# Patient Record
Sex: MALE | Race: WHITE | NOT HISPANIC OR LATINO | URBAN - NONMETROPOLITAN AREA
[De-identification: names, ages, dates, MRNs, and addresses within clinical notes are randomized per-mention and may not be internally consistent; named-entity substitution may affect disease eponyms.]

---

## 2021-01-11 ENCOUNTER — OFFICE VISIT (OUTPATIENT)
Dept: FAMILY MEDICINE CLINIC | Facility: CLINIC | Age: 61
End: 2021-01-11

## 2021-01-11 VITALS
SYSTOLIC BLOOD PRESSURE: 128 MMHG | DIASTOLIC BLOOD PRESSURE: 76 MMHG | HEIGHT: 67 IN | OXYGEN SATURATION: 99 % | HEART RATE: 65 BPM | WEIGHT: 160 LBS | RESPIRATION RATE: 16 BRPM | BODY MASS INDEX: 25.11 KG/M2

## 2021-01-11 DIAGNOSIS — S61.210A LACERATION OF RIGHT INDEX FINGER WITHOUT FOREIGN BODY WITHOUT DAMAGE TO NAIL, INITIAL ENCOUNTER: Primary | ICD-10-CM

## 2021-01-11 PROCEDURE — 12002 RPR S/N/AX/GEN/TRNK2.6-7.5CM: CPT | Performed by: NURSE PRACTITIONER

## 2021-01-11 NOTE — PROGRESS NOTES
"Chief Complaint  Laceration (Pt presents with a laceration to right pointer finger)    Subjective          Max Lala presents to [facility] for laceration management.   History of Present Illness  Laceration   New. Occurred about 20 minutes ago. Right index finger. Cut with a knife in his home. Knife clean. Bleeding is controlled with pressure. He is holding a bandage to the wound. Wound is clean. He can move and feel the digit.  Reports he believes he received a tetanus vaccine about 1 year ago, he'd like to check before receiving another.   Objective   Vital Signs:   /76 (BP Location: Right arm, Patient Position: Sitting, Cuff Size: Adult)   Pulse 65   Resp 16   Ht 170.2 cm (67\") Comment: Per patient  Wt 72.6 kg (160 lb) Comment: Per patient  SpO2 99%   BMI 25.06 kg/m²     Physical Exam  Vitals signs and nursing note reviewed.   Constitutional:       Appearance: He is well-developed.   HENT:      Head: Normocephalic and atraumatic.   Eyes:      Conjunctiva/sclera: Conjunctivae normal.   Neck:      Musculoskeletal: Neck supple.   Cardiovascular:      Rate and Rhythm: Normal rate and regular rhythm.   Pulmonary:      Effort: Pulmonary effort is normal.   Lymphadenopathy:      Cervical: No cervical adenopathy.   Skin:     Findings: Laceration present.          Neurological:      Mental Status: He is alert and oriented to person, place, and time.        Result Review :       \plain   Laceration Repair    Date/Time: 1/11/2021 2:02 PM  Performed by: Josie Aguilar APRN  Authorized by: Josie Aguilar APRN   Consent: Verbal consent obtained. Written consent obtained.  Risks and benefits: risks, benefits and alternatives were discussed  Consent given by: patient  Patient understanding: patient states understanding of the procedure being performed  Patient consent: the patient's understanding of the procedure matches consent given  Procedure consent: procedure " "consent matches procedure scheduled  Patient identity confirmed: verbally with patient  Time out: Immediately prior to procedure a \"time out\" was called to verify the correct patient, procedure, equipment, support staff and site/side marked as required.  Body area: upper extremity  Location details: right index finger  Laceration length: 3 cm  Foreign bodies: no foreign bodies  Tendon involvement: none  Nerve involvement: none  Vascular damage: no  Anesthesia: digital block    Anesthesia:  Local Anesthetic: lidocaine 1% without epinephrine  Anesthetic total: 3 mL    Sedation:  Patient sedated: no    Preparation: Patient was prepped and draped in the usual sterile fashion.  Irrigation solution: saline  Irrigation method: syringe and tap  Amount of cleaning: standard  Debridement: none  Degree of undermining: none  Skin closure: 4-0 nylon  Number of sutures: 6  Technique: simple  Approximation: close  Approximation difficulty: simple  Dressing: 4x4 sterile gauze, antibiotic ointment and splint  Patient tolerance: patient tolerated the procedure well with no immediate complications            Assessment and Plan    Problem List Items Addressed This Visit     None      Visit Diagnoses     Laceration of right index finger without foreign body without damage to nail, initial encounter    -  Primary          Follow Up   Return in about 2 weeks (around 1/25/2021) for remove sutures, Recheck.  Patient was given instructions and counseling regarding his condition or for health maintenance advice. Please see specific information pulled into the AVS if appropriate.       "

## 2021-01-11 NOTE — PATIENT INSTRUCTIONS
Wound Closure Removal, Care After  This sheet gives you information about how to care for yourself after your stitches (sutures), staples, or skin adhesives have been removed. Your health care provider may also give you more specific instructions. If you have problems or questions, contact your health care provider.  What can I expect after the procedure?  After your sutures or staples have been removed or your skin adhesives have fallen off, it is common to have:  · Some discomfort and swelling in the area.  · Slight redness in the area.  Follow these instructions at home:  If you have a bandage:  · Wash your hands with soap and water before you change your bandage (dressing). If soap and water are not available, use hand .  · Change your dressing as told by your health care provider. If your dressing becomes wet or dirty, or develops a bad smell, change it as soon as possible.  · If your dressing sticks to your skin, pour warm, clean water over it until it loosens and can be removed without pulling apart the wound edges. Pat the area dry with a soft, clean towel. Do not rub the wound because that may cause bleeding.  Wound care    · Keep the wound area dry and clean.  · Check your wound every day for signs of infection. Check for:  ? Redness, swelling, or pain.  ? Fluid or blood.  ? Warmth.  ? Pus or a bad smell.  · Wash your hands with soap and water before and after touching your wound.  · Apply cream or ointment only as told by your health care provider. If you are using cream or ointment, wash the area with soap and water 2 times a day to remove all the cream or ointment. Rinse off the soap and pat the area dry with a clean towel.  · If skin glue or adhesive strips were applied after sutures or staples were removed, leave these closures in place until they peel off on their own. If adhesive strip edges start to loosen and curl up, you may trim the loose edges. Do not remove adhesive strips completely  unless your health care provider tells you to do that.  · Continue to protect the wound from injury.  · Do not pick at your wound. Picking can cause an infection.  Bathing  · Do not take baths, swim, or use a hot tub until your health care provider approves.  · Ask your health care provider when it is okay to shower.  · Follow these steps for showering:  ? If you have a dressing, remove it before getting into the shower.  ? In the shower, allow soapy water to get on the wound. Avoid scrubbing the wound.  ? When you get out of the shower, dry the wound by patting it with a clean towel.  ? Reapply a dressing over the wound if needed.  Scar care  · When your wound has completely healed, take actions to help decrease the size of your scar:  ? Wear sunscreen over the scar or cover it with clothing when you are outside. New scars get sunburned easily, which can make scarring worse.  ? Gently massage the scarred area. This can decrease scar thickness.  General instructions  · Take over-the-counter and prescription medicines only as told by your health care provider.  · Keep all follow-up visits as told by your health care provider. This is important.  Contact a health care provider if:  · You have redness, swelling, or pain around your wound.  · You have fluid or blood coming from your wound.  · Your wound feels warm to the touch.  · You have pus or a bad smell coming from your wound.  · Your wound opens up.  · You have chills.  Get help right away if:  · You have a fever.  · You have redness that is spreading from your wound.  Summary  · Change your dressing as told by your health care provider. If your dressing becomes wet or dirty, or develops a bad smell, change it as soon as possible.  · Check your wound every day for signs of infection.  · Wash your hands with soap and water before and after touching your wound.  This information is not intended to replace advice given to you by your health care provider. Make sure  you discuss any questions you have with your health care provider.  Document Revised: 11/30/2018 Document Reviewed: 10/08/2018  [employer] Patient Education © 2020 [employer]

## 2021-01-25 ENCOUNTER — CLINICAL SUPPORT (OUTPATIENT)
Dept: FAMILY MEDICINE CLINIC | Facility: CLINIC | Age: 61
End: 2021-01-25